# Patient Record
Sex: FEMALE | Race: BLACK OR AFRICAN AMERICAN
[De-identification: names, ages, dates, MRNs, and addresses within clinical notes are randomized per-mention and may not be internally consistent; named-entity substitution may affect disease eponyms.]

---

## 2019-01-22 ENCOUNTER — HOSPITAL ENCOUNTER (EMERGENCY)
Dept: HOSPITAL 87 - ER | Age: 59
Discharge: HOME | End: 2019-01-22
Payer: MEDICARE

## 2019-01-22 VITALS — WEIGHT: 165.35 LBS | HEIGHT: 65 IN | BODY MASS INDEX: 27.55 KG/M2

## 2019-01-22 VITALS — DIASTOLIC BLOOD PRESSURE: 79 MMHG | SYSTOLIC BLOOD PRESSURE: 180 MMHG

## 2019-01-22 DIAGNOSIS — Z98.61: ICD-10-CM

## 2019-01-22 DIAGNOSIS — E11.9: ICD-10-CM

## 2019-01-22 DIAGNOSIS — I10: ICD-10-CM

## 2019-01-22 DIAGNOSIS — R06.02: Primary | ICD-10-CM

## 2019-01-22 LAB
APPEARANCE UR: (no result)
BASE EXCESS BLDA CALC-SCNC: -2.8 MMOL/L (ref -2–2)
BASOPHILS NFR BLD AUTO: 0.9 % (ref 0–2)
CHLORIDE SERPL-SCNC: 112 MEQ/L (ref 98–107)
COHGB MFR BLDA: 1.5 % (ref 0.5–1.5)
COLOR UR: (no result)
DO-HGB MFR BLDA: 2.3 % (ref 0–5)
EOSINOPHIL NFR BLD AUTO: 1.6 % (ref 0–5)
ERYTHROCYTE [DISTWIDTH] IN BLOOD BY AUTOMATED COUNT: 13.3 % (ref 11.6–14.6)
HCO3 BLDA-SCNC: 21.1 MMOL/L (ref 22–26)
HCT VFR BLD AUTO: 45.4 % (ref 36–48)
HGB BLD-MCNC: 14.6 G/DL (ref 12–16)
HGB BLDA OXIMETRY-MCNC: 15 G/DL (ref 12–18)
HGB UR QL STRIP: (no result)
INHALED O2 CONCENTRATION: 21 %
KETONES UR STRIP-MCNC: (no result) MG/DL
LEUKOCYTE ESTERASE UR QL STRIP: NEGATIVE
LYMPHOCYTES NFR BLD AUTO: 28.8 % (ref 20–50)
MCH RBC QN AUTO: 29.8 PG (ref 28–32)
MCV RBC AUTO: 92.5 FL (ref 81–99)
METHGB MFR BLDA: 0.4 % (ref 0–1.5)
MONOCYTES NFR BLD AUTO: 5.3 % (ref 2–8)
NEUTROPHILS NFR BLD AUTO: 63.4 % (ref 40–76)
NITRITE UR QL STRIP: NEGATIVE
OXYHGB MFR BLDA: 95.8 % (ref 94–97)
PCO2 TEMP ADJ BLDA: 34.4 MMHG (ref 35–45)
PH TEMP ADJ BLDA: 7.41 [PH] (ref 7.35–7.45)
PH UR STRIP: 6 [PH] (ref 4.5–8)
PLATELET # BLD AUTO: 221 X1000/UL (ref 130–400)
PMV BLD AUTO: 8.8 FL (ref 7.4–10.4)
PO2 TEMP ADJ BLDA: 103.7 MMHG (ref 75–100)
PROT UR QL STRIP: (no result)
RBC # BLD AUTO: 4.91 MILL/UL (ref 4.2–5.4)
SAO2 % BLDA: 97.7 % (ref 92–98.5)
SP GR UR STRIP: 1.03 (ref 1–1.03)
SPECIMEN DRAWN FROM PATIENT: (no result)
UROBILINOGEN UR STRIP-MCNC: 0.2 E.U./DL (ref 0.2–1)
VENTILATION MODE VENT: (no result)

## 2019-01-22 PROCEDURE — 93005 ELECTROCARDIOGRAM TRACING: CPT

## 2019-01-22 PROCEDURE — 84484 ASSAY OF TROPONIN QUANT: CPT

## 2019-01-22 PROCEDURE — 99284 EMERGENCY DEPT VISIT MOD MDM: CPT

## 2019-01-22 PROCEDURE — 82805 BLOOD GASES W/O2 SATURATION: CPT

## 2019-01-22 PROCEDURE — 83880 ASSAY OF NATRIURETIC PEPTIDE: CPT

## 2019-01-22 PROCEDURE — 82375 ASSAY CARBOXYHB QUANT: CPT

## 2019-01-22 PROCEDURE — 36415 COLL VENOUS BLD VENIPUNCTURE: CPT

## 2019-01-22 PROCEDURE — 36600 WITHDRAWAL OF ARTERIAL BLOOD: CPT

## 2019-01-22 PROCEDURE — 85379 FIBRIN DEGRADATION QUANT: CPT

## 2019-01-22 PROCEDURE — 71045 X-RAY EXAM CHEST 1 VIEW: CPT

## 2019-02-22 NOTE — OPTHALMOLOGY H&P
Ophthalmology H&P


H&P


Chief Complaint:  decreased vision in right eye





HPI


Vision Affects Ability to:  read, focus/use eyes together


Past Ocular History:  retinal problems - NPDR Severe OU


HPI Narrative


Blurry Vision





Exam


Visual Acuity:  OD  20/80        OD 20/60


Tension:  OD    16            OS  13


Eye Exam:  normal OU: external exam, palpebral fissure-width, marginal reflex 

distance, levator function, corneas, anterior chambers; findings: lens - 

Cataract NS OU, fundus exam - Severe NPDR OU





Assessment/Plan


Treatment Plan:  cataract extraction w/ lens implant


Goals of Treatment:  improvement of vision, enhance quality of life





Attestation


Attestation


The risks and benefits of the surgery as well as alternative procedures were 

explained to the patient in detail.











Cesar Navarro MD Feb 22, 2019 13:38

## 2019-02-22 NOTE — PRE-PROCEDURE NOTE/ATTESTATION
Pre-Procedure Note/Attestation


Complete Prior to Procedure


Planned Procedure:  right


Procedure Narrative:


Cataract extraction With Intraocular Lens Implant Right Eye





Indications for Procedure


Pre-Operative Diagnosis:


Cataract Right Eye





Attestation


I attest that I discussed the nature of the procedure; its benefits; risks and 

complications; and alternatives (and the risks and benefits of such alternatives

), prior to the procedure, with the patient (or the patient's legal 

representative).





I attest that, if there was a reasonable possibility of needing a blood 

transfusion, the patient (or the patient's legal representative) was given the 

[facility] standardized written summary, pursuant 

to the [employer] Blood Safety Act ([place] Health and Safety Code # 1645, as 

amended).





I attest that I re-evaluated the patient just prior to the surgery and that 

there has been no change in the patient's H&P, except as documented below:











Cesar Navarro MD Feb 22, 2019 13:35

## 2019-02-25 ENCOUNTER — HOSPITAL ENCOUNTER (OUTPATIENT)
Dept: HOSPITAL 72 - SUR | Age: 59
Discharge: HOME | End: 2019-02-25
Payer: MEDICARE

## 2019-02-25 VITALS — SYSTOLIC BLOOD PRESSURE: 152 MMHG | DIASTOLIC BLOOD PRESSURE: 83 MMHG

## 2019-02-25 VITALS — SYSTOLIC BLOOD PRESSURE: 151 MMHG | DIASTOLIC BLOOD PRESSURE: 88 MMHG

## 2019-02-25 VITALS — BODY MASS INDEX: 28.52 KG/M2 | WEIGHT: 155 LBS | HEIGHT: 62 IN

## 2019-02-25 VITALS — DIASTOLIC BLOOD PRESSURE: 100 MMHG | SYSTOLIC BLOOD PRESSURE: 159 MMHG

## 2019-02-25 VITALS — DIASTOLIC BLOOD PRESSURE: 98 MMHG | SYSTOLIC BLOOD PRESSURE: 152 MMHG

## 2019-02-25 VITALS — SYSTOLIC BLOOD PRESSURE: 157 MMHG | DIASTOLIC BLOOD PRESSURE: 95 MMHG

## 2019-02-25 VITALS — SYSTOLIC BLOOD PRESSURE: 149 MMHG | DIASTOLIC BLOOD PRESSURE: 90 MMHG

## 2019-02-25 VITALS — SYSTOLIC BLOOD PRESSURE: 165 MMHG | DIASTOLIC BLOOD PRESSURE: 105 MMHG

## 2019-02-25 VITALS — SYSTOLIC BLOOD PRESSURE: 154 MMHG | DIASTOLIC BLOOD PRESSURE: 87 MMHG

## 2019-02-25 VITALS — DIASTOLIC BLOOD PRESSURE: 90 MMHG | SYSTOLIC BLOOD PRESSURE: 141 MMHG

## 2019-02-25 DIAGNOSIS — I25.10: ICD-10-CM

## 2019-02-25 DIAGNOSIS — H25.11: Primary | ICD-10-CM

## 2019-02-25 DIAGNOSIS — I25.2: ICD-10-CM

## 2019-02-25 DIAGNOSIS — I10: ICD-10-CM

## 2019-02-25 DIAGNOSIS — E11.40: ICD-10-CM

## 2019-02-25 DIAGNOSIS — Z95.5: ICD-10-CM

## 2019-02-25 DIAGNOSIS — K21.9: ICD-10-CM

## 2019-02-25 PROCEDURE — 94150 VITAL CAPACITY TEST: CPT

## 2019-02-25 PROCEDURE — 66984 XCAPSL CTRC RMVL W/O ECP: CPT

## 2019-02-25 PROCEDURE — 82962 GLUCOSE BLOOD TEST: CPT

## 2019-02-25 PROCEDURE — 94003 VENT MGMT INPAT SUBQ DAY: CPT

## 2019-02-25 RX ADMIN — TOBRAMYCIN SCH DROP: 3 SOLUTION OPHTHALMIC at 08:00

## 2019-02-25 RX ADMIN — TOBRAMYCIN SCH DROP: 3 SOLUTION OPHTHALMIC at 07:52

## 2019-02-25 RX ADMIN — DICLOFENAC SODIUM SCH DROP: 1 SOLUTION/ DROPS OPHTHALMIC at 08:07

## 2019-02-25 RX ADMIN — PHENYLEPHRINE HYDROCHLORIDE SCH DROP: 100 SOLUTION/ DROPS OPHTHALMIC at 08:00

## 2019-02-25 RX ADMIN — CYCLOPENTOLATE HYDROCHLORIDE SCH DROP: 10 SOLUTION OPHTHALMIC at 07:52

## 2019-02-25 RX ADMIN — TOBRAMYCIN SCH DROP: 3 SOLUTION OPHTHALMIC at 08:07

## 2019-02-25 RX ADMIN — PHENYLEPHRINE HYDROCHLORIDE SCH DROP: 100 SOLUTION/ DROPS OPHTHALMIC at 07:52

## 2019-02-25 RX ADMIN — CYCLOPENTOLATE HYDROCHLORIDE SCH DROP: 10 SOLUTION OPHTHALMIC at 08:00

## 2019-02-25 RX ADMIN — DICLOFENAC SODIUM SCH DROP: 1 SOLUTION/ DROPS OPHTHALMIC at 07:52

## 2019-02-25 RX ADMIN — DICLOFENAC SODIUM SCH DROP: 1 SOLUTION/ DROPS OPHTHALMIC at 08:00

## 2019-02-25 RX ADMIN — CYCLOPENTOLATE HYDROCHLORIDE SCH DROP: 10 SOLUTION OPHTHALMIC at 08:07

## 2019-02-25 RX ADMIN — Medication SCH DROP: at 08:07

## 2019-02-25 RX ADMIN — Medication SCH DROP: at 08:01

## 2019-02-25 RX ADMIN — PHENYLEPHRINE HYDROCHLORIDE SCH DROP: 100 SOLUTION/ DROPS OPHTHALMIC at 08:07

## 2019-02-25 RX ADMIN — Medication SCH DROP: at 07:52

## 2019-02-25 NOTE — ANETHESIA PREOPERATIVE EVAL
Anesthesia Pre-op PMH/ROS


General


Date of Evaluation:  Feb 25, 2019


ASA Score:  ASA 1


Mallampati Score


Class I : Soft palate, uvula, fauces, pillars visible


Class II: Soft palate, uvula, fauces visible


Class III: Soft palate, base of uvula visible


Class IV: Only hard plate visible


Mallampati Classification:  Class I - q


Allergies:  


Coded Allergies:  


     No Known Allergies (Unverified , 2/25/19)


Patient NPO?:  Yes





Anesthesia Pre-op Phys. Exam


Physician Exam





Last Vital Signs








  Date Time  Temp Pulse Resp B/P (MAP) Pulse Ox O2 Delivery O2 Flow Rate FiO2


 


2/25/19 10:49 97.2 65 19 152/98 98 Room Air  











Airway Exam


Mallampati Score:  Class I - q











Marylin Eagle MD Feb 25, 2019 11:30

## 2019-02-25 NOTE — IMMEDIATE POST-OP EVALUATION
Immediate Post-Op Evalulation


Immediate Post-Op Evalulation


Procedure:  r cataract


Date of Evaluation:  Feb 25, 2019


Nausea:  No


Vomiting:  No











Marylin Eagle MD Feb 25, 2019 11:30

## 2019-02-27 NOTE — OPERATIVE NOTE - PDOC
Operative Note


Operative Note


Date of Operation/Procedure:  Feb 25, 2019


Chief Complaint:  blurry vision


Pre-op Diagnosis:


Cataract Right Eye


Procedure:


phaco with IOL


Post-op Diagnosis:


Pseudophakia


Post-op Diagnosis:  same as pre-op


Operative Findings:  consistent w/pre-op dx studies


Surgeon:  Melanie


Anesthesiologist:  Fco


Anesthesia:  MAC


Specimen:  none


Complications:  none


Condition:  stable


Fluids:  LR


Estimated Blood Loss:  none


Drains:  none


Implant(s) used?:  Yes


Indications for Procedure


phaco with IOL


Description of Procedure


This patient has been complaining visually significant cataract in the affected 

eye with the best corrected visual acuity under moderate glare conditions 

worse. The patient complains of difficulties with glare in performing 

activities of daily living and wants to manage personal affairs with comfort 

and accuracy and see well enough to move with safety at home and outdoors.


    


The risks, benefits and alternatives of the procedure were discussed with the 

patient in the office prior to scheduling surgery. All questions from the 

patient were answered after the surgical procedure was explained in detail. The 

risks of the procedure as explained to the patient include, but are not limited 

to, pain, infection, bleeding, loss of vision, retinal detachment, need for 

further surgery, loss of lens nucleus, double vision, etc. Alternative 

procedures were discussed which include, to do nothing or seek a second 

opinion. Informed consent for this procedure was obtained from the patient. The 

patient was referred to a primary care physician for a cardiopulmonary 

clearance prior to surgery, after proper evaluation was done patient was 

properly scheduled for outpatient surgery.


The patient was brought to the operating room where the anesthesiologist 

established I.V. lines and cardiac monitoring leads. Mild intravenous sedation 

was administered.   The patient was then prepared with a 5% solution of povidone

-iodine to the conjunctival fornix and lashes, and a  5% solution of povidone-

iodine to the lids and periorbital skin. The patient was then draped in the 

usual sterile fashion. A lid speculum was then placed in the operative eye. A 

keratome blade was then used to create a biplanar incision into the anterior 

chamber. Viscoelastics was then instilled into the anterior chamber.


A curvilinear capsulorrhexis was then fashioned with an utrata forceps followed 

by  hydrodissection  and hydro delineation of  the lens nucleus with G 27 

cannula. Paracentesis incision was made at 3 o'clock with sharp blade. The 

phacoemulsification unit, after being properly adjusted  and tested, was then 

used to emulsify the nucleus followed by aspiration and irrigation of residual 

cortical material. Healon was then instilled into the anterior chamber. The 

corneal wound was then enlarged to the size of the optic with the elvin 

keratome blade. The intraocular lens was then inspected for right  power and 

size  and thought to be satisfactory. Then the lens was gently placed in the 

capsular bag. Positioning within the capsular bag was confirmed by direct 

visualization. Optic centration was accomplished with a Sinskey hook.


Viscoelastics  was removed from the anterior chamber using the irrigation and 

aspiration unit. The corneal wound was then tested for leaks and none were 

found. The lid speculum were then removed. Sponge and needle counts were 

correct. An eye patch and shield were placed over the operative eye. The 

patient was taken to the recovery room in stable condition. There were no 

complications. The patient tolerated the procedure well. The patient was then 

transferred to the ambulatory surgery unit in stable and satisfactory condition

, was given detailed written instructions and asked to follow up  in the office 

the next day.











Cesar Navarro MD Feb 27, 2019 10:17

## 2019-02-27 NOTE — BRIEF OPERATIVE NOTE
Immediate Post Operative Note


Operative Note


Chief Complaint:  blurry vision


Pre-op Diagnosis:


Cataract Right Eye


Procedure:


phaco with IOL


Post-op Diagnosis:


Pseudophakia


Post-op Diagnosis:  same as pre-op


Findings:  consistent w/pre-op dx studies


Surgeon:  Melanie


Anesthesiologist:  Fco


Anesthesia:  MAC


Specimen:  none


Complications:  none


Condition:  stable


Fluids:  LR


Estimated Blood Loss:  none


Drains:  none


Implant(s) used?:  Yes











Cesar Navarro MD Feb 27, 2019 10:15